# Patient Record
Sex: FEMALE | Race: WHITE
[De-identification: names, ages, dates, MRNs, and addresses within clinical notes are randomized per-mention and may not be internally consistent; named-entity substitution may affect disease eponyms.]

---

## 2017-07-20 ENCOUNTER — HOSPITAL ENCOUNTER (OUTPATIENT)
Dept: HOSPITAL 47 - RADUSWWP | Age: 37
Discharge: HOME | End: 2017-07-20
Payer: COMMERCIAL

## 2017-07-20 DIAGNOSIS — R10.11: ICD-10-CM

## 2017-07-20 DIAGNOSIS — Z12.31: Primary | ICD-10-CM

## 2017-07-20 PROCEDURE — 76705 ECHO EXAM OF ABDOMEN: CPT

## 2017-07-20 NOTE — US
EXAMINATION TYPE: US gallbladder

 

DATE OF EXAM: 7/20/2017

 

COMPARISON: NONE

 

CLINICAL HISTORY: LUQ pain R10.2. Pt states RUQ tenderness

 

EXAM MEASUREMENTS:

 

Liver Length:  13.0 cm   

Gallbladder Wall:  0.2 cm   

CBD:  0.3 cm

Right Kidney:  10.4 x 4.5 x 4.4 cm

 

 

 

Pancreas:  wnl

Liver:  wnl  

Gallbladder:  wnl

**Evidence for sonographic Bruce's sign:  No

CBD:  wnl 

Right Kidney:  wnl 

 

**No abnormality visualized to account for pt's symptoms**

 

IMPRESSION: No significant abnormality appreciated.

## 2017-07-20 NOTE — MM
Reason for exam: screening  (asymptomatic).

Baseline mammogram.



History:

Family history of breast cancer in aunt.



Physical Findings:

Nurse did not find any significant physical abnormalities on exam.



MG Screening Mammo w CAD

Bilateral CC and MLO view(s) were taken.

The breast tissue is heterogeneously dense. This may lower the sensitivity of 

mammography.  No suspicious calcifications are seen. There is no discrete 

abnormality.



These results were verbally communicated with the patient and result sheet given 

to the patient on 7/20/17.





ASSESSMENT: Negative, BI-RAD 1



RECOMMENDATION:

Routine screening mammogram of both breasts at age 40.

## 2019-09-06 ENCOUNTER — HOSPITAL ENCOUNTER (OUTPATIENT)
Dept: HOSPITAL 47 - ORWHC2ENDO | Age: 39
End: 2019-09-06
Attending: INTERNAL MEDICINE
Payer: COMMERCIAL

## 2019-09-06 VITALS — HEART RATE: 77 BPM | SYSTOLIC BLOOD PRESSURE: 146 MMHG | DIASTOLIC BLOOD PRESSURE: 82 MMHG

## 2019-09-06 VITALS — BODY MASS INDEX: 33.2 KG/M2

## 2019-09-06 VITALS — TEMPERATURE: 97.8 F | RESPIRATION RATE: 16 BRPM

## 2019-09-06 DIAGNOSIS — K52.9: Primary | ICD-10-CM

## 2019-09-06 DIAGNOSIS — Z87.891: ICD-10-CM

## 2019-09-06 PROCEDURE — 88305 TISSUE EXAM BY PATHOLOGIST: CPT

## 2019-09-06 PROCEDURE — 81025 URINE PREGNANCY TEST: CPT

## 2019-09-06 PROCEDURE — 45380 COLONOSCOPY AND BIOPSY: CPT

## 2019-09-06 NOTE — P.PCN
Date of Procedure: 09/06/19


Procedure(s) Performed: 


BRIEF HISTORY: Patient is a 39-year-old pleasant white female scheduled for an 

elective colonoscopy as a part of evaluation of chronic diarrhea for the last 2 

years duration.  She has bowel movements anywhere from 10-12 a day which are 

loose to watery in consistency.  No blood or mucus in the stool.  No family 

history of inflammatory bowel disease.





PROCEDURE PERFORMED: Colonoscopy with biopsy. 





PREOPERATIVE DIAGNOSIS: Chronic diarrhea. 





IV sedation per Anesthesia. 





PROCEDURE: After informed consent was obtained, the patient, was brought into 

the endoscopy unit. IV sedation was administered by Anesthesia under continuous 

monitoring.  Digital rectal examination was normal. Initially the Olympus CF-160

flexible video colonoscope was then inserted in the rectum, gradually advanced 

into the cecum without any difficulty. Careful examination was performed as the 

scope was gradually being withdrawn. Ileocecal valve and the appendiceal orifice

were visualized and appeared normal.  Prep was excellent. Mucosa of the cecum, 

ascending colon, transverse colon, descending colon, sigmoid colon, and rectum 

appeared normal.  Random biopsies were done from ascending and descending colon 

to rule out microscopic/collagenous colitis.  Retroflexion was performed in the 

rectum and no lesions were seen. The patient tolerated the procedure well. 





IMPRESSION: Normal-appearing colon from rectum to cecum with no evidence of 

colorectal neoplasia





RECOMMENDATIONS:  Findings of this examination were discussed with the patient 

as well as a family.  She was advised to follow with the biopsy results and 

she'll be seen in office in 2 weeks..

## 2019-10-16 ENCOUNTER — HOSPITAL ENCOUNTER (OUTPATIENT)
Dept: HOSPITAL 47 - LABWHC1 | Age: 39
Discharge: HOME | End: 2019-10-16
Attending: PHYSICIAN ASSISTANT
Payer: COMMERCIAL

## 2019-10-16 DIAGNOSIS — R19.7: Primary | ICD-10-CM

## 2019-10-16 LAB
ERYTHROCYTE [DISTWIDTH] IN BLOOD BY AUTOMATED COUNT: 4.19 M/UL (ref 3.8–5.4)
ERYTHROCYTE [DISTWIDTH] IN BLOOD: 11.9 % (ref 11.5–15.5)
GLIADIN IGA SER-ACNC: <0.2 U/ML
GLIADIN PEPTIDE IGA SER-ACNC: NEGATIVE
GLIADIN PEPTIDE IGG SER-ACNC: NEGATIVE
HCT VFR BLD AUTO: 40.5 % (ref 34–46)
HGB BLD-MCNC: 13.3 GM/DL (ref 11.4–16)
MCH RBC QN AUTO: 31.9 PG (ref 25–35)
MCHC RBC AUTO-ENTMCNC: 32.9 G/DL (ref 31–37)
MCV RBC AUTO: 96.7 FL (ref 80–100)
PLATELET # BLD AUTO: 405 K/UL (ref 150–450)
WBC # BLD AUTO: 6.7 K/UL (ref 3.8–10.6)

## 2019-10-16 PROCEDURE — 84443 ASSAY THYROID STIM HORMONE: CPT

## 2019-10-16 PROCEDURE — 36415 COLL VENOUS BLD VENIPUNCTURE: CPT

## 2019-10-16 PROCEDURE — 83516 IMMUNOASSAY NONANTIBODY: CPT

## 2019-10-16 PROCEDURE — 85027 COMPLETE CBC AUTOMATED: CPT

## 2020-03-10 ENCOUNTER — HOSPITAL ENCOUNTER (EMERGENCY)
Dept: HOSPITAL 47 - EC | Age: 40
Discharge: HOME | End: 2020-03-10
Payer: COMMERCIAL

## 2020-03-10 VITALS
HEART RATE: 103 BPM | SYSTOLIC BLOOD PRESSURE: 151 MMHG | RESPIRATION RATE: 24 BRPM | DIASTOLIC BLOOD PRESSURE: 98 MMHG | TEMPERATURE: 98.7 F
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
## 2020-03-10 DIAGNOSIS — S01.01XA: Primary | ICD-10-CM
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
## 2020-03-10 DIAGNOSIS — Z23: ICD-10-CM

## 2020-03-10 DIAGNOSIS — Z87.891: ICD-10-CM

## 2020-03-10 PROCEDURE — 90715 TDAP VACCINE 7 YRS/> IM: CPT

## 2020-03-10 PROCEDURE — 99282 EMERGENCY DEPT VISIT SF MDM: CPT

## 2020-03-10 PROCEDURE — 12001 RPR S/N/AX/GEN/TRNK 2.5CM/<: CPT

## 2020-03-10 PROCEDURE — 90471 IMMUNIZATION ADMIN: CPT

## 2020-03-10 PROCEDURE — 96372 THER/PROPH/DIAG INJ SC/IM: CPT
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
## 2021-12-30 ENCOUNTER — HOSPITAL ENCOUNTER (OUTPATIENT)
Dept: HOSPITAL 47 - RADMAMWWP | Age: 41
Discharge: HOME | End: 2021-12-30
Attending: OBSTETRICS & GYNECOLOGY
Payer: COMMERCIAL

## 2021-12-30 DIAGNOSIS — Z12.31: Primary | ICD-10-CM

## 2021-12-30 DIAGNOSIS — Z80.3: ICD-10-CM

## 2021-12-30 PROCEDURE — 77067 SCR MAMMO BI INCL CAD: CPT

## 2022-01-03 NOTE — MM
Reason for exam: screening  (asymptomatic).

Last mammogram was performed 4 years and 5 months ago.



History:

Family history of breast cancer in aunt.



Physical Findings:

A clinical breast exam by your physician is recommended on an annual basis and 

results should be correlated with mammographic findings.



MG Screening Mammo w CAD

Bilateral CC and MLO view(s) were taken.

Prior study comparison: July 20, 2017, bilateral MG screening mammo w CAD.

The breast tissue is heterogeneously dense. This may lower the sensitivity of 

mammography.  Lateral asymmetric density right CC view is more defined. Further 

evaluation recommended.





ASSESSMENT: Incomplete: need additional imaging evaluation, BI-RAD 0



RECOMMENDATION:

Special view mammogram of the right breast.

(3D)



If lesion persists on supplemental views, image directed ultrasound is 

recommended.



[facility] will attempt to contact patient to return for supplemental 

views and ultrasound if indicated.

## 2022-01-05 ENCOUNTER — HOSPITAL ENCOUNTER (OUTPATIENT)
Dept: HOSPITAL 47 - RADMAMWWP | Age: 42
Discharge: HOME | End: 2022-01-05
Attending: OBSTETRICS & GYNECOLOGY
Payer: COMMERCIAL

## 2022-01-05 DIAGNOSIS — Z80.3: ICD-10-CM

## 2022-01-05 DIAGNOSIS — R92.8: Primary | ICD-10-CM

## 2022-01-05 PROCEDURE — 77061 BREAST TOMOSYNTHESIS UNI: CPT

## 2022-01-05 PROCEDURE — 77065 DX MAMMO INCL CAD UNI: CPT

## 2022-01-05 NOTE — MM
Reason for exam: additional evaluation requested from abnormal screening.

Last mammogram was performed less than 1 month ago.



History:

Family history of breast cancer in aunt.

Took hormonal contraceptives for 20 years.



Physical Findings:

Nurse did not find any significant physical abnormalities on exam.



MG 3D Work Up W/Cad RT

Spot compression CC, LM, and CCRL view(s) were taken of the right breast.

Prior study comparison: December 30, 2021, bilateral MG screening mammo w CAD.  

July 20, 2017, bilateral MG screening mammo w CAD.

The breast tissue is heterogeneously dense. This may lower the sensitivity of 

mammography.  Lateral asymmetric density disperses on additional views.



These results were verbally communicated with the patient and result sheet given 

to the patient on 1/5/22.





ASSESSMENT: Benign, BI-RAD 2



RECOMMENDATION:

Return to routine screening mammogram schedule for both breasts.